# Patient Record
Sex: FEMALE | Race: BLACK OR AFRICAN AMERICAN | Employment: FULL TIME | URBAN - METROPOLITAN AREA
[De-identification: names, ages, dates, MRNs, and addresses within clinical notes are randomized per-mention and may not be internally consistent; named-entity substitution may affect disease eponyms.]

---

## 2018-12-22 ENCOUNTER — HOSPITAL ENCOUNTER (EMERGENCY)
Age: 38
Discharge: HOME | End: 2018-12-22
Attending: EMERGENCY MEDICINE
Payer: MEDICAID

## 2018-12-22 VITALS
OXYGEN SATURATION: 100 % | RESPIRATION RATE: 15 BRPM | SYSTOLIC BLOOD PRESSURE: 117 MMHG | TEMPERATURE: 98 F | HEIGHT: 57 IN | HEART RATE: 95 BPM | DIASTOLIC BLOOD PRESSURE: 70 MMHG | WEIGHT: 112 LBS | BODY MASS INDEX: 24.16 KG/M2

## 2018-12-22 DIAGNOSIS — S39.011A ABDOMINAL WALL STRAIN, INITIAL ENCOUNTER: Primary | ICD-10-CM

## 2018-12-22 PROCEDURE — 99283 EMERGENCY DEPT VISIT LOW MDM: CPT

## 2018-12-22 PROCEDURE — 6370000000 HC RX 637 (ALT 250 FOR IP): Performed by: EMERGENCY MEDICINE

## 2018-12-22 RX ADMIN — IBUPROFEN 600 MG: 400 TABLET ORAL at 11:07

## 2018-12-22 ASSESSMENT — PAIN SCALES - GENERAL
PAINLEVEL_OUTOF10: 8
PAINLEVEL_OUTOF10: 8

## 2018-12-22 ASSESSMENT — PAIN DESCRIPTION - PAIN TYPE
TYPE: ACUTE PAIN
TYPE: ACUTE PAIN

## 2018-12-22 ASSESSMENT — PAIN DESCRIPTION - DESCRIPTORS
DESCRIPTORS: ACHING
DESCRIPTORS: ACHING

## 2018-12-22 ASSESSMENT — PAIN DESCRIPTION - LOCATION
LOCATION: PELVIS
LOCATION: PELVIS

## 2018-12-22 ASSESSMENT — PAIN DESCRIPTION - ORIENTATION: ORIENTATION: RIGHT

## 2018-12-22 NOTE — ED NOTES
Patient given discharge instructions verbal and written, patient verbalized understanding. Alert/oriented X4, Clear speech.   Patient exhibits no distress, ambulates with steady gait per self leaving unit, no further request.     Eric Love RN  12/22/18 1111

## 2022-07-08 ENCOUNTER — HOSPITAL ENCOUNTER (EMERGENCY)
Age: 42
Discharge: HOME | End: 2022-07-09
Attending: EMERGENCY MEDICINE
Payer: MEDICAID

## 2022-07-08 DIAGNOSIS — M54.12 CERVICAL RADICULOPATHY: ICD-10-CM

## 2022-07-08 DIAGNOSIS — R51.9 FRONTAL HEADACHE: Primary | ICD-10-CM

## 2022-07-08 LAB — HCG QUALITATIVE: NEGATIVE

## 2022-07-08 PROCEDURE — 96375 TX/PRO/DX INJ NEW DRUG ADDON: CPT

## 2022-07-08 PROCEDURE — 84703 CHORIONIC GONADOTROPIN ASSAY: CPT

## 2022-07-08 PROCEDURE — 2580000003 HC RX 258: Performed by: PHYSICIAN ASSISTANT

## 2022-07-08 PROCEDURE — 96374 THER/PROPH/DIAG INJ IV PUSH: CPT

## 2022-07-08 PROCEDURE — 6360000002 HC RX W HCPCS: Performed by: PHYSICIAN ASSISTANT

## 2022-07-08 PROCEDURE — 99284 EMERGENCY DEPT VISIT MOD MDM: CPT

## 2022-07-08 RX ORDER — KETOROLAC TROMETHAMINE 30 MG/ML
15 INJECTION, SOLUTION INTRAMUSCULAR; INTRAVENOUS ONCE
Status: COMPLETED | OUTPATIENT
Start: 2022-07-08 | End: 2022-07-08

## 2022-07-08 RX ORDER — FERROUS SULFATE 325(65) MG
325 TABLET ORAL
COMMUNITY
End: 2022-07-08 | Stop reason: SDUPTHER

## 2022-07-08 RX ORDER — SODIUM CHLORIDE, SODIUM LACTATE, POTASSIUM CHLORIDE, CALCIUM CHLORIDE 600; 310; 30; 20 MG/100ML; MG/100ML; MG/100ML; MG/100ML
INJECTION, SOLUTION INTRAVENOUS ONCE
Status: COMPLETED | OUTPATIENT
Start: 2022-07-08 | End: 2022-07-08

## 2022-07-08 RX ORDER — MULTIVITAMIN,THERAPEUTIC
1 TABLET ORAL EVERY EVENING
COMMUNITY
Start: 2022-05-31

## 2022-07-08 RX ORDER — IRON POLYSACCHARIDE COMPLEX 180 MG
1 CAPSULE ORAL EVERY EVENING
COMMUNITY
Start: 2022-06-02

## 2022-07-08 RX ORDER — ONDANSETRON 2 MG/ML
4 INJECTION INTRAMUSCULAR; INTRAVENOUS ONCE
Status: COMPLETED | OUTPATIENT
Start: 2022-07-08 | End: 2022-07-08

## 2022-07-08 RX ADMIN — KETOROLAC TROMETHAMINE 15 MG: 30 INJECTION, SOLUTION INTRAMUSCULAR at 22:29

## 2022-07-08 RX ADMIN — ONDANSETRON 4 MG: 2 INJECTION INTRAMUSCULAR; INTRAVENOUS at 22:32

## 2022-07-08 RX ADMIN — SODIUM CHLORIDE, POTASSIUM CHLORIDE, SODIUM LACTATE AND CALCIUM CHLORIDE: 600; 310; 30; 20 INJECTION, SOLUTION INTRAVENOUS at 22:34

## 2022-07-08 ASSESSMENT — ENCOUNTER SYMPTOMS
VOMITING: 0
NAUSEA: 0
ABDOMINAL PAIN: 0
RHINORRHEA: 0
COLOR CHANGE: 0
COUGH: 0
SHORTNESS OF BREATH: 0
PHOTOPHOBIA: 0
SORE THROAT: 0

## 2022-07-08 ASSESSMENT — PAIN - FUNCTIONAL ASSESSMENT: PAIN_FUNCTIONAL_ASSESSMENT: 0-10

## 2022-07-08 ASSESSMENT — PAIN DESCRIPTION - DESCRIPTORS
DESCRIPTORS: DISCOMFORT;OTHER (COMMENT)
DESCRIPTORS: ACHING

## 2022-07-08 ASSESSMENT — PAIN SCALES - GENERAL
PAINLEVEL_OUTOF10: 8
PAINLEVEL_OUTOF10: 10

## 2022-07-08 ASSESSMENT — PAIN DESCRIPTION - PAIN TYPE: TYPE: ACUTE PAIN

## 2022-07-08 ASSESSMENT — PAIN DESCRIPTION - ORIENTATION: ORIENTATION: ANTERIOR

## 2022-07-08 ASSESSMENT — PAIN DESCRIPTION - LOCATION
LOCATION: HEAD
LOCATION: GENERALIZED

## 2022-07-08 NOTE — ED TRIAGE NOTES
Pt states that she has been feeling weak and dizzy with a headache for the last 2 weeks. States that the R side of her neck has been hurting down to her arm. She feels overall not well.

## 2022-07-09 VITALS
OXYGEN SATURATION: 99 % | SYSTOLIC BLOOD PRESSURE: 108 MMHG | HEART RATE: 80 BPM | DIASTOLIC BLOOD PRESSURE: 68 MMHG | BODY MASS INDEX: 24.35 KG/M2 | RESPIRATION RATE: 15 BRPM | HEIGHT: 57 IN | TEMPERATURE: 98.2 F | WEIGHT: 112.88 LBS

## 2022-07-09 PROCEDURE — 6370000000 HC RX 637 (ALT 250 FOR IP): Performed by: PHYSICIAN ASSISTANT

## 2022-07-09 RX ORDER — BUTALBITAL, ACETAMINOPHEN AND CAFFEINE 50; 325; 40 MG/1; MG/1; MG/1
2 TABLET ORAL ONCE
Status: COMPLETED | OUTPATIENT
Start: 2022-07-09 | End: 2022-07-09

## 2022-07-09 RX ORDER — ONDANSETRON 4 MG/1
4 TABLET, ORALLY DISINTEGRATING ORAL EVERY 8 HOURS PRN
Qty: 6 TABLET | Refills: 0 | Status: SHIPPED | OUTPATIENT
Start: 2022-07-09

## 2022-07-09 RX ORDER — BUTALBITAL, ACETAMINOPHEN AND CAFFEINE 50; 325; 40 MG/1; MG/1; MG/1
2 TABLET ORAL EVERY 8 HOURS PRN
Qty: 12 TABLET | Refills: 0 | Status: SHIPPED | OUTPATIENT
Start: 2022-07-09

## 2022-07-09 RX ADMIN — BUTALBITAL, ACETAMINOPHEN, AND CAFFEINE 2 TABLET: 50; 325; 40 TABLET ORAL at 01:30

## 2022-07-09 NOTE — ED PROVIDER NOTES
[address] ENCOUNTER          PHYSICIAN ASSISTANT NOTE       Date of evaluation: 7/8/2022    Chief Complaint     Headache, Nausea, Dizziness, and Neck Pain      History of Present Illness     Tito Escoto is a 46 y.o. female, with a history of iron deficiency and gastritis, who presents to the ED with complaints of a 2-week history of a constant frontal headache as well as soreness in the right neck extending into the right hand. She cannot identify any aggravating or alleviating factors regarding her headache. She reports occasional tingling in her right hand as well as swelling of her right fingers. She is right-hand dominant, denies recent trauma or change in her normal activity. States her neck pain is worse with extreme flexion. She has not been taking any medication for her headache but has been applying a topical pain relieving cream to the neck and also took a muscle relaxer earlier today which was mildly helpful. She took the muscle relaxer before napping today and when she woke up she noted lightheadedness when she was up and moving around. She has had no fever, chills or vomiting but has noted mild nausea. Denies cough and other URI type symptoms. No abdominal pain or changes in urinary or bowel habits. No visual disturbance, numbness or weakness in the extremities. She rates her pain an 8 out of 10 in severity. States she has had headaches in the past but it has been several months and cannot identify her triggers. She otherwise has no complaints. Review of Systems     Review of Systems   Constitutional: Negative for appetite change and fever. HENT: Negative for congestion, rhinorrhea and sore throat. Eyes: Negative for photophobia and visual disturbance. Respiratory: Negative for cough and shortness of breath. Cardiovascular: Negative for chest pain and palpitations. Gastrointestinal: Negative for abdominal pain, nausea and vomiting. Genitourinary: Negative for dysuria, frequency and urgency. Musculoskeletal: Positive for neck pain (right). Negative for arthralgias, myalgias and neck stiffness. Skin: Negative for color change and rash. Neurological: Positive for light-headedness (postural) and headaches. Negative for dizziness, weakness and numbness (tinging in right fingers). All other systems reviewed and are negative. Past Medical, Surgical, Family, and Social History     She has a past medical history of Anemia, Gastritis, and Vitamin D deficiency. She has a past surgical history that includes  section. Her family history is not on file. She reports that she has never smoked. She has never used smokeless tobacco. She reports current drug use. Drug: Marijuana Darryle Pimple). She reports that she does not drink alcohol. Medications     Previous Medications    MULTIPLE VITAMIN (THERA-TABS) TABS    Take 1 tablet by mouth every evening    PROFE 391.3 (180 FE) MG CAPS    Take 1 capsule by mouth every evening       Allergies     She has No Known Allergies. Physical Exam     INITIAL VITALS: BP: 124/80, Temp: 98.2 °F (36.8 °C), Heart Rate: 91, Resp: 18, SpO2: 99 %  Physical Exam  Vitals reviewed. Constitutional:       General: She is not in acute distress. Appearance: She is well-developed and normal weight. HENT:      Head: Normocephalic and atraumatic. Mouth/Throat:      Mouth: Mucous membranes are moist.   Eyes:      Extraocular Movements: Extraocular movements intact. Conjunctiva/sclera: Conjunctivae normal.   Neck:      Trachea: Phonation normal.      Meningeal: Brudzinski's sign and Kernig's sign absent. Cardiovascular:      Rate and Rhythm: Normal rate and regular rhythm. Heart sounds: No murmur heard. No friction rub. No gallop. Pulmonary:      Effort: Pulmonary effort is normal. No respiratory distress. Breath sounds: No wheezing, rhonchi or rales.    Musculoskeletal:      Cervical back: Normal range of motion and neck supple. Muscular tenderness (right trapezius) present. No spinous process tenderness. Normal range of motion. Skin:     General: Skin is warm and dry. Findings: No petechiae or rash. Neurological:      Mental Status: She is alert and oriented to person, place, and time. Cranial Nerves: Cranial nerves are intact. Sensory: Sensation is intact. Motor: Motor function is intact. Psychiatric:         Mood and Affect: Mood and affect normal.         Behavior: Behavior normal.         RECENT VITALS:  BP: 108/68, Temp: 98.2 °F (36.8 °C), Heart Rate: 80, Resp: 15, SpO2: 99 %     ED Course     Nursing Notes, Past Medical Hx,Past Surgical Hx, Social Hx, Allergies, and Family Hx were reviewed. The patient was given the following medications:  Orders Placed This Encounter   Medications    lactated ringers infusion    ketorolac (TORADOL) injection 15 mg    ondansetron (ZOFRAN) injection 4 mg    butalbital-acetaminophen-caffeine (FIORICET, ESGIC) per tablet 2 tablet    butalbital-acetaminophen-caffeine (FIORICET, ESGIC) -40 MG per tablet     Sig: Take 2 tablets by mouth every 8 hours as needed for Headaches     Dispense:  12 tablet     Refill:  0    ondansetron (ZOFRAN ODT) 4 MG disintegrating tablet     Sig: Take 1 tablet by mouth every 8 hours as needed for Nausea     Dispense:  6 tablet     Refill:  0       CONSULTS:  None    MEDICAL DECISION MAKING / ASSESSMENT / Chester Awa is a 46 y.o. female presenting with a 2-week history of frontal headache associated with mild nausea also complains of right-sided neck pain extending into the right arm to the hand with occasional tingling in the right fingers. The patient's vital signs are stable, she has an unremarkable physical exam, specifically no meningeal signs or neurologic deficits. Her right neck and arm symptoms may be related to cervical radiculopathy.   Her headache may be tension related. IV access was established. She was given IV fluids along with Toradol and Zofran after which she reported her headache down to a 4. She was then given 2 Fioricet prior to discharge. hCG was negative. The patient sates she has been told to avoid oral anti-inflammatory medications due to a history of gastritis. She is therefore prescribed Fioricet to take as directed for headache as well as a friend for nausea if needed. She will follow-up with her primary care provider, calling on Monday, for additional evaluation of her cervical radiculopathy symptoms and if no improvement in her headache. She is encouraged to increase her fluid intake, drinking at least 2 L of water daily. She will return to the ED for any worsening symptoms. This patient was also evaluated by the attending physician. All care plans were discussed and agreed upon. Clinical Impression     1. Frontal headache    2.  Cervical radiculopathy        Disposition     PATIENT REFERRED TO:  Chanel Phelps  [address] [facility] [address] [address] [phone]    Call in 3 days  if no improvement      DISCHARGE MEDICATIONS:  New Prescriptions    BUTALBITAL-ACETAMINOPHEN-CAFFEINE (FIORICET, ESGIC) -40 MG PER TABLET    Take 2 tablets by mouth every 8 hours as needed for Headaches    ONDANSETRON (ZOFRAN ODT) 4 MG DISINTEGRATING TABLET    Take 1 tablet by mouth every 8 hours as needed for Nausea       DISPOSITION  Discharged     Shade Coon [place]  07/09/22 0930

## 2022-07-09 NOTE — ED PROVIDER NOTES
ED Attending Attestation Note     Date of evaluation: 7/8/2022    This patient was seen by the advance practice provider. I have seen and examined the patient, agree with the workup, evaluation, management and diagnosis. The care plan has been discussed. My assessment reveals patient comes in 2-week history of headache, no focal neurodeficits are noted on examination, feels improved after her medications and my evaluation and will be discharged home.      Shaka Kincaid MD  07/09/22 7460